# Patient Record
Sex: FEMALE | Race: WHITE | NOT HISPANIC OR LATINO
[De-identification: names, ages, dates, MRNs, and addresses within clinical notes are randomized per-mention and may not be internally consistent; named-entity substitution may affect disease eponyms.]

---

## 2021-01-01 ENCOUNTER — APPOINTMENT (OUTPATIENT)
Dept: GERIATRICS | Facility: HOME HEALTH | Age: 86
End: 2021-01-01
Payer: MEDICARE

## 2021-01-01 DIAGNOSIS — Z87.01 PERSONAL HISTORY OF PNEUMONIA (RECURRENT): ICD-10-CM

## 2021-01-01 DIAGNOSIS — F03.91 UNSPECIFIED DEMENTIA WITH BEHAVIORAL DISTURBANCE: ICD-10-CM

## 2021-01-01 DIAGNOSIS — Z51.5 ENCOUNTER FOR PALLIATIVE CARE: ICD-10-CM

## 2021-01-01 DIAGNOSIS — N18.9 CHRONIC KIDNEY DISEASE, UNSPECIFIED: ICD-10-CM

## 2021-01-01 PROCEDURE — 99344 HOME/RES VST NEW MOD MDM 60: CPT | Mod: GV

## 2021-01-01 PROCEDURE — 99350 HOME/RES VST EST HIGH MDM 60: CPT | Mod: GV

## 2021-08-16 PROBLEM — Z00.00 ENCOUNTER FOR PREVENTIVE HEALTH EXAMINATION: Status: ACTIVE | Noted: 2021-01-01

## 2021-08-26 PROBLEM — F03.91 AGITATION DUE TO DEMENTIA: Status: ACTIVE | Noted: 2021-01-01

## 2021-08-26 PROBLEM — Z87.01 HISTORY OF ASPIRATION PNEUMONIA: Status: ACTIVE | Noted: 2021-01-01

## 2021-08-26 PROBLEM — N18.9 CKD (CHRONIC KIDNEY DISEASE): Status: ACTIVE | Noted: 2021-01-01

## 2021-08-26 PROBLEM — Z51.5 HOSPICE CARE PATIENT: Status: ACTIVE | Noted: 2021-01-01

## 2021-08-26 NOTE — HISTORY OF PRESENT ILLNESS
[FreeTextEntry1] : pt seen in the home with HHA present \par pt is bedbound minimally verbal \par \par SHe is a 89 y/o F with history of dementia living at home with daughter Katrina recently discharged from hospital  for respiratory distress aspiration pna + rhino/enterovirus, pt recently treated for cellulitis of left wrist \par pt also with COPD.  Reported history of COVID-19. \par \par pt with presistent episodes of  agitation and delrium, currently appears comfortable unable to elicit further information or have meaningful discussion\par \par pt was admitted to home hospice program and is being seen today for f/u \par \par as per pt daughter, pt has been agitated and recent chagnes to medications have been helpful\par pt with recent changes in aides and pt daughter had to leave to drop pt grandaughter off at college and is away for a couple days\par \par + bm eating well denies pain no other complaints at this time\par

## 2021-08-26 NOTE — DATA REVIEWED
[FreeTextEntry1] : CT chest 5/21\par IMPRESSION: \par There is multifocal pneumonia in the dependent right middle lobe and dependent \par right lower lobe, potentially secondary to aspiration given the distribution.\par \par CT head\par FINDINGS: \par Brain: Volume loss and chronic small vessel ischemic change. No brain edema. No \par intracranial hemorrhage. \par Cerebral ventricles: No ventriculomegaly. \par Bones/joints: Unremarkable. No acute fracture. \par Paranasal sinuses: Visualized sinuses are unremarkable. No fluid levels. \par Mastoid air cells: Unremarkable. \par Soft tissues: Unremarkable. \par \par \par IMPRESSION: \par No acute brain findings.\par \par CXR on last admission\par Lungs/Pleura: Mild increase in right lower lobe patchy opacities. Possible trace left pleural effusion, unchanged. No pneumothorax.

## 2021-08-26 NOTE — ASSESSMENT
[FreeTextEntry1] : 89 y/o F with advanced dementia FAST 7 with recurrent aspiration pna with multiple hospitalizations for recurrent aspiration pna currently + rhino/enterovirus, pps of 30%\par \par pt accepted to hospice program \par DNR/DNI--MOLST reviewed\par pt with 24/7 HHA\par has hospital bed\par eating adequately with coughing spells--aspiration precautions\par no artificial nutrition to keep pt home and comfortable is GoC\par daughter asking about leticia lift, discussed need for training and given pt dementia may not be safest for pt given mental state and agitation\par \par seroquel 75 mg /100 mg /125 mg with haldol 5 mg every hour x 3 doses for agitation\par discussed need for acitivty mat and routine\par educated pt HHA as well\par \par pt lives in [address] [place] [id number]\par discussed with hospice team and pt daughter jeffrey aceves\par \par f/u prn as needed, will need flu shot if daughter agreeable [Depression] : depression [Frailty-weight loss of >5%] : frailty-weight loss  [Fall precautions] : fall precautions [Social support] : social support [Living arrangements] : living arrangements [Advanced Directives] : advanced directives [Dental Care] : dental care [Vaccines] : vaccines [Pain Management] : pain management [Medication Management] : medication management [Comfort] : Treatment Guidelines: Comfort [DNR] : Code Status: DNR

## 2021-08-26 NOTE — PHYSICAL EXAM
[Sclera] : the sclera and conjunctiva were normal [Normal Oral Mucosa] : normal oral mucosa [No Oral Pallor] : no oral pallor [No Oral Cyanosis] : no oral cyanosis [Outer Ear] : the ears and nose were normal in appearance [Hearing Threshold Finger Rub Not [place]] : hearing was normal [Examination Of The Oral Cavity] : the lips and gums were normal [Nasal Cavity] : the nasal mucosa and septum were normal [Oropharynx] : The oropharynx was normal [Neck Appearance] : the appearance of the neck was normal [Respiration, Rhythm And Depth] : normal respiratory rhythm and effort [Auscultation Breath Sounds / Voice Sounds] : lungs were clear to auscultation bilaterally [Exaggerated Use Of Accessory Muscles For Inspiration] : no accessory muscle use [Heart Rate And Rhythm] : heart rate was normal and rhythm regular [Heart Sounds] : normal S1 and S2 [Murmurs] : no murmurs [Full Pulse] : the pedal pulses are present [Edema] : there was no peripheral edema [Veins - Varicosity Changes] : there were no varicosital changes [Bowel Sounds] : normal bowel sounds [Abdomen Soft] : soft [Abdomen Tenderness] : non-tender [Abdomen Mass (___ Cm)] : no abdominal mass palpated [Nail Clubbing] : no clubbing  or cyanosis of the fingernails [] : no rash [Skin Lesions] : no skin lesions [FreeTextEntry1] : disoriented